# Patient Record
Sex: MALE | Race: WHITE | Employment: FULL TIME | URBAN - METROPOLITAN AREA
[De-identification: names, ages, dates, MRNs, and addresses within clinical notes are randomized per-mention and may not be internally consistent; named-entity substitution may affect disease eponyms.]

---

## 2018-10-04 ENCOUNTER — HOSPITAL ENCOUNTER (EMERGENCY)
Age: 53
Discharge: HOME | End: 2018-10-04
Payer: COMMERCIAL

## 2018-10-04 ENCOUNTER — APPOINTMENT (OUTPATIENT)
Dept: GENERAL RADIOLOGY | Age: 53
End: 2018-10-04
Payer: COMMERCIAL

## 2018-10-04 VITALS
WEIGHT: 160 LBS | OXYGEN SATURATION: 98 % | TEMPERATURE: 98.9 F | SYSTOLIC BLOOD PRESSURE: 139 MMHG | RESPIRATION RATE: 16 BRPM | DIASTOLIC BLOOD PRESSURE: 81 MMHG | HEART RATE: 64 BPM | BODY MASS INDEX: 25.11 KG/M2 | HEIGHT: 67 IN

## 2018-10-04 DIAGNOSIS — S61.012S LACERATION OF LEFT THUMB WITHOUT FOREIGN BODY WITHOUT DAMAGE TO NAIL, SEQUELA: Primary | ICD-10-CM

## 2018-10-04 PROCEDURE — 96372 THER/PROPH/DIAG INJ SC/IM: CPT

## 2018-10-04 PROCEDURE — 2500000003 HC RX 250 WO HCPCS: Performed by: PHYSICIAN ASSISTANT

## 2018-10-04 PROCEDURE — 73130 X-RAY EXAM OF HAND: CPT

## 2018-10-04 PROCEDURE — 99283 EMERGENCY DEPT VISIT LOW MDM: CPT

## 2018-10-04 PROCEDURE — 6360000002 HC RX W HCPCS: Performed by: NURSE PRACTITIONER

## 2018-10-04 PROCEDURE — 12002 RPR S/N/AX/GEN/TRNK2.6-7.5CM: CPT

## 2018-10-04 RX ORDER — KETOROLAC TROMETHAMINE 30 MG/ML
30 INJECTION, SOLUTION INTRAMUSCULAR; INTRAVENOUS ONCE
Status: COMPLETED | OUTPATIENT
Start: 2018-10-04 | End: 2018-10-04

## 2018-10-04 RX ORDER — LIDOCAINE HYDROCHLORIDE 10 MG/ML
2 INJECTION, SOLUTION EPIDURAL; INFILTRATION; INTRACAUDAL; PERINEURAL ONCE
Status: COMPLETED | OUTPATIENT
Start: 2018-10-04 | End: 2018-10-04

## 2018-10-04 RX ORDER — CEPHALEXIN 500 MG/1
500 CAPSULE ORAL 4 TIMES DAILY
Qty: 40 CAPSULE | Refills: 0 | Status: SHIPPED | OUTPATIENT
Start: 2018-10-04 | End: 2018-10-14

## 2018-10-04 RX ADMIN — LIDOCAINE HYDROCHLORIDE 2 ML: 10 INJECTION, SOLUTION EPIDURAL; INFILTRATION; INTRACAUDAL; PERINEURAL at 16:00

## 2018-10-04 RX ADMIN — KETOROLAC TROMETHAMINE 30 MG: 30 INJECTION, SOLUTION INTRAMUSCULAR; INTRAVENOUS at 15:55

## 2018-10-04 ASSESSMENT — PAIN SCALES - GENERAL: PAINLEVEL_OUTOF10: 2

## 2018-10-04 NOTE — ED PROVIDER NOTES
hemodynamically stable throughout their entire ED visit and are stable for discharge with outpatient follow-up. The plan has been discussed in detail and they are aware of the specific conditions for emergent return, as well as the importance of follow-up. Counseling: The emergency provider has spoken with the patient and spouse/SO and discussed todays results, in addition to providing specific details for the plan of care and counseling regarding the diagnosis and prognosis. Questions are answered at this time and they are agreeable with the plan. Assessment      1. Laceration of left thumb without foreign body without damage to nail, sequela      Plan   Discharge to home  Patient condition is stable    New Medications     Discharge Medication List as of 10/4/2018  5:35 PM      START taking these medications    Details   cephALEXin (KEFLEX) 500 MG capsule Take 1 capsule by mouth 4 times daily for 10 days, Disp-40 capsule, R-0Print           Electronically signed by Jena Mahmood PA-C   DD: 10/4/18  **This report was transcribed using voice recognition software. Every effort was made to ensure accuracy; however, inadvertent computerized transcription errors may be present.   END OF ED PROVIDER NOTE      Jena Mahmood PA-C  10/04/18 Carri [address], MINO  10/04/18 Carri 38, MINO  10/04/18 [id number]

## 2018-10-04 NOTE — ED NOTES
Pt in Room 33; c/o L index finger crush injury; states that he dropped a steel plate on it one hour PTA; states that his tetanus shot is up to date      Christiana Wong RN  10/04/18 [id number]

## 2023-01-03 ENCOUNTER — NURSE TRIAGE (OUTPATIENT)
Dept: OTHER | Facility: CLINIC | Age: 58
End: 2023-01-03

## 2023-01-03 NOTE — TELEPHONE ENCOUNTER
Location of patient: [facility]    Limited triage completed with wife, patient unable to speak on the phone. Received call from Kacy montiel at [facility] with [employer]. Subjective: Caller states \"He is having numbness and tingling in arms and feet. I am not sure if it is caused from an accident. He was chopping firewood so I am not sure if that is what it was from. His back was kind of hurting and then he started getting these symptoms. \"     Current Symptoms: numbness and tingling in fingers and feet, back pain     Onset: 1 week ago;     Associated Symptoms: NA    Pain Severity: mild-moderate pain in back     Temperature: None     What has been tried: advil      Recommended disposition: See in Office Today or Tomorrow    Care advice provided, patient verbalizes understanding; denies any other questions or concerns; instructed to call back for any new or worsening symptoms. Sent message to [facility] ([employer]) chat to call patient back due to long wait. Attention Provider: Thank you for allowing me to participate in the care of your patient. The patient was connected to triage in response to information provided to the ECC/PSC. Please do not respond through this encounter as the response is not directed to a shared pool.       Reason for Disposition   Age > 48 and no history of prior similar back pain    Protocols used: Back Pain-ADULT-[place]

## 2023-01-06 ENCOUNTER — OFFICE VISIT (OUTPATIENT)
Dept: FAMILY MEDICINE CLINIC | Age: 58
End: 2023-01-06
Payer: COMMERCIAL

## 2023-01-06 VITALS
HEART RATE: 75 BPM | TEMPERATURE: 98.4 F | WEIGHT: 153.4 LBS | SYSTOLIC BLOOD PRESSURE: 166 MMHG | BODY MASS INDEX: 24.08 KG/M2 | HEIGHT: 67 IN | OXYGEN SATURATION: 97 % | DIASTOLIC BLOOD PRESSURE: 80 MMHG

## 2023-01-06 DIAGNOSIS — R20.0 NUMBNESS AND TINGLING IN BOTH HANDS: ICD-10-CM

## 2023-01-06 DIAGNOSIS — R20.0 NUMBNESS AND TINGLING OF BOTH FEET: ICD-10-CM

## 2023-01-06 DIAGNOSIS — R20.2 NUMBNESS AND TINGLING OF BOTH FEET: ICD-10-CM

## 2023-01-06 DIAGNOSIS — Z76.89 ENCOUNTER TO ESTABLISH CARE: Primary | ICD-10-CM

## 2023-01-06 DIAGNOSIS — R20.2 NUMBNESS AND TINGLING IN BOTH HANDS: ICD-10-CM

## 2023-01-06 LAB
ALBUMIN SERPL-MCNC: 4.5 G/DL (ref 3.5–5.2)
ALP BLD-CCNC: 92 U/L (ref 40–129)
ALT SERPL-CCNC: 15 U/L (ref 0–40)
ANION GAP SERPL CALCULATED.3IONS-SCNC: 13 MMOL/L (ref 7–16)
AST SERPL-CCNC: 14 U/L (ref 0–39)
BASOPHILS ABSOLUTE: 0.05 E9/L (ref 0–0.2)
BASOPHILS RELATIVE PERCENT: 0.7 % (ref 0–2)
BILIRUB SERPL-MCNC: 0.4 MG/DL (ref 0–1.2)
BUN BLDV-MCNC: 10 MG/DL (ref 6–20)
C-REACTIVE PROTEIN: <0.3 MG/DL (ref 0–0.4)
CALCIUM SERPL-MCNC: 9.8 MG/DL (ref 8.6–10.2)
CHLORIDE BLD-SCNC: 101 MMOL/L (ref 98–107)
CHOLESTEROL, TOTAL: 235 MG/DL (ref 0–199)
CO2: 26 MMOL/L (ref 22–29)
CREAT SERPL-MCNC: 0.8 MG/DL (ref 0.7–1.2)
EOSINOPHILS ABSOLUTE: 0.08 E9/L (ref 0.05–0.5)
EOSINOPHILS RELATIVE PERCENT: 1.1 % (ref 0–6)
FOLATE: 13 NG/ML (ref 4.8–24.2)
GFR SERPL CREATININE-BSD FRML MDRD: >60 ML/MIN/1.73
GLUCOSE BLD-MCNC: 111 MG/DL (ref 74–99)
HCT VFR BLD CALC: 47.7 % (ref 37–54)
HDLC SERPL-MCNC: 67 MG/DL
HEMOGLOBIN: 16 G/DL (ref 12.5–16.5)
IMMATURE GRANULOCYTES #: 0.03 E9/L
IMMATURE GRANULOCYTES %: 0.4 % (ref 0–5)
LDL CHOLESTEROL CALCULATED: 156 MG/DL (ref 0–99)
LYMPHOCYTES ABSOLUTE: 1.31 E9/L (ref 1.5–4)
LYMPHOCYTES RELATIVE PERCENT: 18.6 % (ref 20–42)
MCH RBC QN AUTO: 31.6 PG (ref 26–35)
MCHC RBC AUTO-ENTMCNC: 33.5 % (ref 32–34.5)
MCV RBC AUTO: 94.1 FL (ref 80–99.9)
MONOCYTES ABSOLUTE: 0.61 E9/L (ref 0.1–0.95)
MONOCYTES RELATIVE PERCENT: 8.7 % (ref 2–12)
NEUTROPHILS ABSOLUTE: 4.97 E9/L (ref 1.8–7.3)
NEUTROPHILS RELATIVE PERCENT: 70.5 % (ref 43–80)
PDW BLD-RTO: 12.5 FL (ref 11.5–15)
PLATELET # BLD: 284 E9/L (ref 130–450)
PMV BLD AUTO: 11.3 FL (ref 7–12)
POTASSIUM SERPL-SCNC: 3.9 MMOL/L (ref 3.5–5)
RBC # BLD: 5.07 E12/L (ref 3.8–5.8)
SODIUM BLD-SCNC: 140 MMOL/L (ref 132–146)
TOTAL PROTEIN: 7.3 G/DL (ref 6.4–8.3)
TRIGL SERPL-MCNC: 61 MG/DL (ref 0–149)
TSH SERPL DL<=0.05 MIU/L-ACNC: 0.8 UIU/ML (ref 0.27–4.2)
VITAMIN B-12: 584 PG/ML (ref 211–946)
VITAMIN D 25-HYDROXY: 52 NG/ML (ref 30–100)
VLDLC SERPL CALC-MCNC: 12 MG/DL
WBC # BLD: 7.1 E9/L (ref 4.5–11.5)

## 2023-01-06 PROCEDURE — G8420 CALC BMI NORM PARAMETERS: HCPCS | Performed by: FAMILY MEDICINE

## 2023-01-06 PROCEDURE — 99203 OFFICE O/P NEW LOW 30 MIN: CPT | Performed by: FAMILY MEDICINE

## 2023-01-06 PROCEDURE — G8427 DOCREV CUR MEDS BY ELIG CLIN: HCPCS | Performed by: FAMILY MEDICINE

## 2023-01-06 PROCEDURE — 3017F COLORECTAL CA SCREEN DOC REV: CPT | Performed by: FAMILY MEDICINE

## 2023-01-06 PROCEDURE — 1036F TOBACCO NON-USER: CPT | Performed by: FAMILY MEDICINE

## 2023-01-06 PROCEDURE — G8484 FLU IMMUNIZE NO ADMIN: HCPCS | Performed by: FAMILY MEDICINE

## 2023-01-06 ASSESSMENT — ENCOUNTER SYMPTOMS
DIARRHEA: 0
SHORTNESS OF BREATH: 0
COUGH: 0
CONSTIPATION: 0
FACIAL SWELLING: 0
SINUS PAIN: 0
CHEST TIGHTNESS: 0
PHOTOPHOBIA: 0
SINUS PRESSURE: 0
ABDOMINAL DISTENTION: 0
BLOOD IN STOOL: 0
COLOR CHANGE: 0
RHINORRHEA: 0
APNEA: 0
VOMITING: 0

## 2023-01-06 ASSESSMENT — PATIENT HEALTH QUESTIONNAIRE - PHQ9
2. FEELING DOWN, DEPRESSED OR HOPELESS: 0
SUM OF ALL RESPONSES TO PHQ QUESTIONS 1-9: 0
SUM OF ALL RESPONSES TO PHQ QUESTIONS 1-9: 0
1. LITTLE INTEREST OR PLEASURE IN DOING THINGS: 0
SUM OF ALL RESPONSES TO PHQ QUESTIONS 1-9: 0
SUM OF ALL RESPONSES TO PHQ9 QUESTIONS 1 & 2: 0
SUM OF ALL RESPONSES TO PHQ QUESTIONS 1-9: 0

## 2023-01-06 NOTE — PROGRESS NOTES
Establish care: Portia Richey is a 59 y.o. male, who presents to the office today for establishment of care. History reviewed. No pertinent past medical history. No Known Allergies    No current outpatient medications on file prior to visit. No current facility-administered medications on file prior to visit. History reviewed. No pertinent family history. History reviewed. No pertinent surgical history. Social History     Socioeconomic History    Marital status:      Spouse name: None    Number of children: None    Years of education: None    Highest education level: None   Tobacco Use    Smoking status: Never    Smokeless tobacco: Never   Substance and Sexual Activity    Drug use: No    Sexual activity: Yes     Partners: Female       Social History     Substance and Sexual Activity   Sexual Activity Yes    Partners: Female        Establish Care:   Generally Healthy 59 y.o. Past medical history, allergies, medications, weight changes, exercise habits, dietary habits, family history, occupational history, surgical history, sexual history all were reviewed today and are noted in the chart. Numbness and Tingling:   Hands and feet   Feet first then hands a few days later  Does not wax and wane. Has been present for 1 week  No fevers   No chills   No injury       BP (!) 166/80   Pulse 75   Temp 98.4 °F (36.9 °C) (Temporal)   Ht 5' 7\" (1.702 m)   Wt 153 lb 6.4 oz (69.6 kg)   SpO2 97%   BMI 24.03 kg/m²     Review of Systems   Constitutional:  Negative for chills, fatigue and fever. HENT:  Negative for congestion, ear pain, facial swelling, rhinorrhea, sinus pressure and sinus pain. Eyes:  Negative for photophobia and visual disturbance. Respiratory:  Negative for apnea, cough, chest tightness and shortness of breath. Cardiovascular:  Negative for chest pain and palpitations.    Gastrointestinal:  Negative for abdominal distention, blood in stool, constipation, diarrhea and vomiting. Endocrine: Negative for cold intolerance, polydipsia, polyphagia and polyuria. Genitourinary:  Negative for decreased urine volume, frequency and urgency. Musculoskeletal:  Negative for arthralgias and gait problem. Skin:  Negative for color change. Allergic/Immunologic: Negative for environmental allergies and food allergies. Neurological:  Negative for dizziness, light-headedness and headaches. Hematological:  Negative for adenopathy. Psychiatric/Behavioral:  Negative for agitation and confusion. Physical Exam  Constitutional:       Appearance: Normal appearance. HENT:      Head: Normocephalic and atraumatic. Right Ear: Tympanic membrane and ear canal normal. There is no impacted cerumen. Left Ear: Tympanic membrane and ear canal normal. There is no impacted cerumen. Nose: Nose normal. No congestion or rhinorrhea. Eyes:      Extraocular Movements: Extraocular movements intact. Pupils: Pupils are equal, round, and reactive to light. Cardiovascular:      Rate and Rhythm: Normal rate and regular rhythm. Heart sounds: No murmur heard. No friction rub. No gallop. Pulmonary:      Effort: Pulmonary effort is normal.      Breath sounds: Normal breath sounds. Chest:      Chest wall: No tenderness. Abdominal:      General: Abdomen is flat. Bowel sounds are normal. There is no distension. Palpations: Abdomen is soft. Tenderness: There is no abdominal tenderness. Musculoskeletal:         General: Normal range of motion. Cervical back: Normal range of motion. Skin:     General: Skin is warm and dry. Neurological:      General: No focal deficit present. Mental Status: He is alert and oriented to person, place, and time. Psychiatric:         Mood and Affect: Mood normal.       Assessment & Plan:   Deandra Smith was seen today for new patient and back pain.     Diagnoses and all orders for this visit:    Encounter to establish care    Numbness and tingling in both hands  -     Vitamin D 25 Hydroxy; Future  -     Lipid Panel; Future  -     Comprehensive Metabolic Panel; Future  -     TSH; Future  -     CBC with Auto Differential; Future  -     Vitamin B12 & Folate; Future  -     Lyme Disease Acute Reflexive Panel; Future  -     Sedimentation Rate; Future  -     C-Reactive Protein; Future    Numbness and tingling of both feet  -     Vitamin D 25 Hydroxy; Future  -     Lipid Panel; Future  -     Comprehensive Metabolic Panel; Future  -     TSH; Future  -     CBC with Auto Differential; Future  -     Vitamin B12 & Folate; Future  -     Lyme Disease Acute Reflexive Panel; Future  -     Sedimentation Rate; Future  -     C-Reactive Protein; Future          Follow Up:   No follow-ups on file. Counseled regarding above diagnosis, including possible risks and complications,  especially if left uncontrolled. Counseled regarding the possible side effects, risks,benefits and alternatives to treatment; patient and/or guardian verbalizes understanding, agrees, feels comfortable with and wishes to proceed with above treatment plan. Advised patient to call with any newmedication issues, and read all Rx info from pharmacy to assure aware of all possible risks and side effects of medication before taking. Patient and/or guardian verbalizes understanding and agrees with above counseling, assessment and plan. All questions answered.

## 2023-01-07 LAB — SEDIMENTATION RATE, ERYTHROCYTE: 0 MM/HR (ref 0–15)

## 2023-01-09 LAB — LYME, EIA: 0.19 LIV (ref 0–1.2)

## 2023-01-16 DIAGNOSIS — R20.0 NUMBNESS AND TINGLING IN BOTH HANDS: Primary | ICD-10-CM

## 2023-01-16 DIAGNOSIS — R20.0 NUMBNESS AND TINGLING OF BOTH FEET: ICD-10-CM

## 2023-01-16 DIAGNOSIS — R20.2 NUMBNESS AND TINGLING OF BOTH FEET: ICD-10-CM

## 2023-01-16 DIAGNOSIS — R20.2 NUMBNESS AND TINGLING IN BOTH HANDS: Primary | ICD-10-CM

## 2023-01-24 ENCOUNTER — HOSPITAL ENCOUNTER (OUTPATIENT)
Dept: NEUROLOGY | Age: 58
Discharge: HOME | End: 2023-01-24
Payer: COMMERCIAL

## 2023-01-24 VITALS — HEIGHT: 67 IN | WEIGHT: 155 LBS | BODY MASS INDEX: 24.33 KG/M2

## 2023-01-24 DIAGNOSIS — R20.2 NUMBNESS AND TINGLING IN BOTH HANDS: ICD-10-CM

## 2023-01-24 DIAGNOSIS — R20.0 NUMBNESS AND TINGLING OF BOTH FEET: ICD-10-CM

## 2023-01-24 DIAGNOSIS — R20.2 NUMBNESS AND TINGLING OF BOTH FEET: ICD-10-CM

## 2023-01-24 DIAGNOSIS — R20.0 NUMBNESS AND TINGLING IN BOTH HANDS: ICD-10-CM

## 2023-01-24 PROCEDURE — 95886 MUSC TEST DONE W/N TEST COMP: CPT

## 2023-01-24 PROCEDURE — 95913 NRV CNDJ TEST 13/> STUDIES: CPT

## 2023-01-24 NOTE — PROCEDURES
[facility]  Electrodiagnostic Laboratory  [address] [place] [address]  Phone: [phone]  Fax: [phone]      Referring Provider: Ashley Moreno MD  Primary Care Physician: Ashley Moreno MD  Patient Name: Paulie Melendez  Patient YOB: 1965  Gender: male  BMI: Body mass index is 24.28 kg/m².  Height 5' 7\" (1.702 m), weight 155 lb (70.3 kg).    1/24/2023    Reason for referral: Numbness and tingling of hands and feet    Description of clinical problem:   Patient reports numbness/tingling in the hands and feet on and off for years.  He states that approximately 3 to 4 weeks ago he had significant worsening of numbness and tingling in the hands and feet.  He states he is noticing tingling extending up to the knees in the bilateral lower extremities.  He states that his symptoms seemed to rapidly worsen over about a week's time, but now denies any ongoing worsening.  No significant weakness reported.  He does endorse issues with chronic nonradiating low back pain.      Pain: Yes   ; Numbness/tingling: Yes; Weakness: No       Brief physical exam:   Sensory deficit: Yes; Weakness: No; Atrophy: No    Study Limitations: None    Motor NCS      Nerve / Sites Lat. Lat Diff Amplitude Amp.1-2 Distance Velocity Temp.    ms ms mV % cm m/s °C   L Tibial - AH      Ankle 7.50  0.6 100 8  32.6      Pop fossa 20.73 13.23 0.6 94.5 40 30 32.6   R Median - APB      Wrist 10.26  2.7 100 8  31      Elbow 14.74 4.48 3.4 124 20 45 31.2   L Median - APB      Wrist 9.58  3.4 100 8  31.6      Elbow 15.68 6.09 4.4 132 20 33 31.6   R Ulnar - ADM      Wrist 5.68  5.2 100 8  31.7      B.Elbow 9.79 4.11 4.6 88.5 19 46 31.7      A.Elbow 12.66 2.86 4.2 79.7 10 35 31.6   R Peroneal - EDB      Ankle 7.34  3.2 100 8  33.1      Fib head 14.74 7.40 2.7 84.5 27 37 33.3      Pop fossa 17.45 2.71 2.8 85.2 10 37 33.4   L Peroneal - EDB      Ankle 8.13  5.5 100 8  34      Fib head 14.90 6.77 4.3 79.5 27 40 34      Pop  fossa 18.07 3.18 4.2 76.9 10 31 34   R Tibial - AH      Ankle 7.86  0.9 100 8  32.6      Pop fossa 18.96 11.09 1.8 192 40 36 32.5       Sensory NCS      Nerve / Sites Onset Lat Peak Lat PP Amp Distance Velocity Temp.    ms ms µV cm m/s °C   R Median - Digit II (Antidromic)      Mid Palm NR NR NR 7 NR 31.9      Wrist NR NR NR 14 NR 31.9   L Median - Digit II (Antidromic)      Mid Palm    7  32      Wrist NR NR NR 14 NR 32   R Ulnar - Digit V (Antidromic)      Wrist NR NR NR 14 NR 31.7   R Radial - Anatomical snuff box (Forearm)      Forearm NR NR NR 10 NR 31.7   R Superficial peroneal - Ankle      Lat leg NR NR NR 10 NR 33.9   L Superficial peroneal - Ankle      Lat leg NR NR NR 10 NR 34.3   L Sural - Ankle (Calf)      Calf NR NR NR 14 NR 32.6   R Sural - Ankle (Calf)      Calf NR NR NR 14 NR 32.5       F  Wave      Nerve F Lat M Lat F-M Lat    ms ms ms   R Peroneal - EDB 59.4 8.0 51.4   L Peroneal - EDB 58.4 5.2 53.2   L Tibial - AH NR NR NR   R Tibial - AH NR NR NR   R Median - APB 40.9 14.2 26.7   R Ulnar - ADM 41.0 4.1 36.9   L Median - APB 45.7 11.9 33.8       H Reflex      Nerve Lat Hmax    ms   L Tibial - Soleus NR   R Tibial - Soleus 36.7       EMG         EMG Summary Table     Spontaneous MUAP Recruitment   Muscle IA Fib PSW Fasc H.F. Amp Dur. PPP Pattern   R. Tibialis anterior N None None None None 1+ 1+ N Sl Decr   R. Extensor hallucis longus N None None None None 1+ 1+ 1+ Sl Decr   R. Gastrocnemius (Medial head) N None None None None 1+ N N Sl Decr   R. Vastus medialis N None None None None N N N N   R. Gluteus medius N None None None None N N N N   R. Lumbar paraspinals (mid) N None None None None N N N N   R. Lumbar paraspinals (low) Sl Incr None 1+ None None N N N N   R. Deltoid N None None None None N N N N   R. Biceps brachii N None None None None N N N N   R. Triceps brachii N None None None None N N N N   R. Pronator teres N None None None None N N N N   R. Flexor carpi ulnaris N None None None  None N N N N   R. First dorsal interosseous N None None None None 1+ N 2+ N   R. Abductor pollicis brevis N None None None None 1+ 1+ N N   R. Extensor indicis proprius N None None None None N N N N   R. Cervical paraspinals (mid) N None None None None N N N N   R. Cervical paraspinals (low) N None None None None N N N N   L. Tibialis anterior N None None None None 1+ 1+ N Sl Decr   L. Extensor hallucis longus N None None None None 1+ 1+ N Sl Decr   L. Gastrocnemius (Medial head) N None None None None 1+ N N N   L. Vastus medialis N None None None None N N N N   L. Gluteus medius N None None None None N N N N   L. Lumbar paraspinals (mid) N None None None None N N N N   L. Lumbar paraspinals (low) Sl Incr None 1+ None None N N N N       Summary of Findings:   Nerve conduction studies: The following nerve conduction studies were abnormal:   ***  All other nerve conduction studies listed in the table above were normal in latency, amplitude and conduction velocity. Needle EMG:   Needle EMG was performed using a *** needle. The following abnormalities were seen on needle EMG: ***  All other muscles tested, as listed in the table above demonstrated normal amplitude, duration, phases and recruitment and no active denervation signs were seen. Diagnostic Interpretation: This study was {Normal/Abnormal:9037464919}. Electrodiagnosis: There is electrodiagnostic evidence of a***. Location: {RIGHT/LEFT/MARIBELL:} {SYMMETRIC/ASYMMETRIC:807383053}; {Desc; prox/mid/distal:17183}, at the ***. Nature: [  ] Axonal   [  ] Demyelinating  [  ] Mixed axonal and demyelinating     [  ] Sensory [  ] Motor               [  ] Mixed sensorimotor     [  ] with active denervation       [  ] without active denervation  Duration: {Desc; acute/subacute/chronic:81669}  Severity: {MILD/MODERATE/SEVERE:}  Prognosis: {Prognosis:42611}    Previous Study: There {IS/IS JV} a prior study for comparison. Date: ***. Provider: ***. Compared to prior study, today's study showed***. Follow up EMG is recommended if ***. Technologist: ***  Physician:***    Nerve conduction studies and electromyography were performed according to our laboratory policies and procedures which can be provided upon request. All abnormal values are identified in the table.  Laboratory normal values can also be provided upon request.       Cc: MD Elías Guerrero MD

## 2023-02-08 ENCOUNTER — TELEPHONE (OUTPATIENT)
Dept: FAMILY MEDICINE CLINIC | Age: 58
End: 2023-02-08

## 2023-02-08 NOTE — TELEPHONE ENCOUNTER
Wife So Oliveros called to follow up on yesterday's [facility] request for EMG results. It has been 2 weeks since Robert Natan had testing and 1 month since he was initially evaluated by Dr Cristina Samuels. Both So Oliveros and Robertelijah Fleminga are concerned and would like to know what is causing his numbness. States that when she reached out to Dr [facility] office, she was informed that they are short staffed and only have one neurologist to read the study. So Oliveros would like Dr Victorino Boeck office to reach out to neurology to see if a reading can be expedited. I called and left a message for Florin Garcia in the EMG department to see if she is able to assist in getting results.

## 2023-02-08 NOTE — TELEPHONE ENCOUNTER
Anastasiia Patton from [facility] returned my call. Results are now available. Can you review and provide recommendations?

## 2023-02-09 NOTE — TELEPHONE ENCOUNTER
I have seen the report. I will personally reach out to the patient.      Thank you,      True Hillman MD  8:43 AM  02/09/23

## 2025-02-11 ENCOUNTER — OFFICE VISIT (OUTPATIENT)
Dept: FAMILY MEDICINE CLINIC | Age: 60
End: 2025-02-11

## 2025-02-11 VITALS
HEART RATE: 73 BPM | SYSTOLIC BLOOD PRESSURE: 140 MMHG | HEIGHT: 67 IN | OXYGEN SATURATION: 98 % | WEIGHT: 162 LBS | BODY MASS INDEX: 25.43 KG/M2 | DIASTOLIC BLOOD PRESSURE: 64 MMHG | TEMPERATURE: 98.5 F

## 2025-02-11 DIAGNOSIS — J01.90 ACUTE NON-RECURRENT SINUSITIS, UNSPECIFIED LOCATION: Primary | ICD-10-CM

## 2025-02-11 PROCEDURE — 99213 OFFICE O/P EST LOW 20 MIN: CPT | Performed by: FAMILY MEDICINE

## 2025-02-11 RX ORDER — AZITHROMYCIN 250 MG/1
TABLET, FILM COATED ORAL
Qty: 6 TABLET | Refills: 0 | Status: SHIPPED | OUTPATIENT
Start: 2025-02-11 | End: 2025-02-21

## 2025-02-11 ASSESSMENT — ENCOUNTER SYMPTOMS
DIARRHEA: 0
CHEST TIGHTNESS: 0
BLOOD IN STOOL: 0
NAUSEA: 0
PHOTOPHOBIA: 0
TROUBLE SWALLOWING: 0
BACK PAIN: 0
EYE DISCHARGE: 0
ABDOMINAL PAIN: 0
SINUS PRESSURE: 1
SINUS PAIN: 0
EYE REDNESS: 0
COUGH: 1
ALLERGIC/IMMUNOLOGIC NEGATIVE: 1
SORE THROAT: 0
VOMITING: 0
EYE PAIN: 0
SHORTNESS OF BREATH: 0
